# Patient Record
Sex: MALE | Employment: FULL TIME | URBAN - METROPOLITAN AREA
[De-identification: names, ages, dates, MRNs, and addresses within clinical notes are randomized per-mention and may not be internally consistent; named-entity substitution may affect disease eponyms.]

---

## 2021-03-08 ENCOUNTER — HOSPITAL ENCOUNTER (EMERGENCY)
Facility: CLINIC | Age: 31
Discharge: HOME | End: 2021-03-08
Attending: EMERGENCY MEDICINE | Admitting: EMERGENCY MEDICINE
Payer: OTHER MISCELLANEOUS

## 2021-03-08 VITALS
RESPIRATION RATE: 18 BRPM | OXYGEN SATURATION: 98 % | TEMPERATURE: 97.4 F | DIASTOLIC BLOOD PRESSURE: 83 MMHG | HEART RATE: 68 BPM | SYSTOLIC BLOOD PRESSURE: 133 MMHG

## 2021-03-08 DIAGNOSIS — S60.351A FOREIGN BODY OF RIGHT THUMB, INITIAL ENCOUNTER: ICD-10-CM

## 2021-03-08 PROCEDURE — 99283 EMERGENCY DEPT VISIT LOW MDM: CPT | Mod: 25

## 2021-03-08 PROCEDURE — 11765 WEDGE EXCISION SKN NAIL FOLD: CPT | Mod: F5

## 2021-03-08 RX ORDER — BUPIVACAINE HYDROCHLORIDE 5 MG/ML
INJECTION, SOLUTION PERINEURAL
Status: DISCONTINUED
Start: 2021-03-08 | End: 2021-03-08 | Stop reason: HOSPADM

## 2021-03-08 NOTE — ED TRIAGE NOTES
Patient presents to the ED stating that he got a piece of body filler stuck between his nail and thumb at work.

## 2021-03-08 NOTE — ED PROVIDER NOTES
"  History     Chief Complaint:  Thumb Injury     HPI   Ty Pierre is a 30 year old male who presents for evaluation of a thumb injury. The patient reports that today he was chipping away \"body filler\", which is a putty that hardens, when a hard piece got lodged between his right thumb and nail bed. He has since been experiencing pain to the thumb and has not been able to dislodge the body filler. The patient believes his last Tdap was several years ago.     Review of Systems   Musculoskeletal:        Right thumb pain and nail pain   All other systems reviewed and are negative.      Allergies:  No Known Allergies    Medications:  No medications.     Past Medical History:    No past medical history.       Social History:  This is a work related injury.  The patient works at [employer].     Physical Exam     Patient Vitals for the past 24 hrs:   BP Temp Pulse Resp SpO2   03/08/21 0921 133/83 97.4  F (36.3  C) 68 18 98 %     Physical Exam  Constitutional:  Alert, attentive  Cardiovascular:  2+ radial and ulnar pulses to the bilateral upper extremities   Neurological:  5/5 strength to the radian, ulnar and median motor distributions;      sensation intact to light touch to the radian, ulnar and median distributions  MSK:   ~4 mm round, white/grey opaque matter under the right thumb fingernail, ulnar aspect and ~4 mm proximal to end of nail  Skin:    Skin is warm and dry.       Emergency Department Course     Procedures:       Partial Nail Wedge Excision      LOCATION:  Right tumb.    ANESTHESIA:  Digital block using bupivacaine 0.5 % total of 3 mLs.    PREPARATION:  Irrigation with Normal Saline.    Procedure: Unable to remove FB with fine tip clamp or forceps and elevating nail; therefore, wedge excision performed removing distal ~6 mm of nail from approximately midline of nail to ulnar aspect. No underlying damage to nailbed. FB particulate matter removed. Area irrigated and bandaged.    Emergency Department " Course:    Reviewed:    I reviewed the patient's nursing notes, vitals.    Assessments:    0946 I performed an exam of the patient as documented above.     1115 Patient rechecked and updated.      Disposition:  The patient was discharged to home.     Impression & Plan        Medical Decision Making:  Ty Pierre is a 30 year old male who presents for evaluation of foreign body underneath the right thumbnail.  Given the particular nature of the foreign body an inability to remove with elevation of the nail, partial wedge excision was performed, removing the distal 6 mm of the nail on the ulnar aspect.  This is the irrigated and dressed.  Confirmed all foreign body material removed.  Plan healing by secondary intent.  No obvious injury to the nailbed.  Recheck in clinic in 2 to 3 days for return to work.  Return precautions for redness, pain, or any other concerns.    Diagnosis:    ICD-10-CM    1. Foreign body of right thumb, initial encounter; subungual  S60.351A      Discharge Medications:  New Prescriptions    No medications on file     Scribe Disclosure:  I, Orla Severson, am serving as a scribe at 9:46 AM on 3/8/2021 to document services personally performed by Prem Orourke MD based on my observations and the provider's statements to me.     [facility] EMERGENCY DEPT         Prem Orourke MD  03/08/21 8229

## 2021-03-08 NOTE — LETTER
March 8, 2021      To Whom It May Concern:      Ty Pierre was seen in our Emergency Department today, 03/08/21. Please excuse him from work until Thursday, when he can return to light duty. He will need to be cleared by a physician to return to normal work.    Sincerely,        Prem Orourke MD